# Patient Record
Sex: MALE | Race: WHITE | NOT HISPANIC OR LATINO | URBAN - METROPOLITAN AREA
[De-identification: names, ages, dates, MRNs, and addresses within clinical notes are randomized per-mention and may not be internally consistent; named-entity substitution may affect disease eponyms.]

---

## 2021-07-07 ENCOUNTER — OFFICE VISIT - RIVER FALLS (OUTPATIENT)
Dept: FAMILY MEDICINE | Facility: CLINIC | Age: 21
End: 2021-07-07

## 2021-07-07 ASSESSMENT — MIFFLIN-ST. JEOR: SCORE: 1991.92

## 2022-02-11 VITALS
HEIGHT: 70 IN | WEIGHT: 217.3 LBS | DIASTOLIC BLOOD PRESSURE: 78 MMHG | SYSTOLIC BLOOD PRESSURE: 150 MMHG | BODY MASS INDEX: 31.11 KG/M2 | HEART RATE: 75 BPM

## 2022-02-16 NOTE — NURSING NOTE
Comprehensive Intake Entered On:  7/7/2021 2:48 PM CDT    Performed On:  7/7/2021 2:44 PM CDT by Denisse Flores CMA               Summary   Chief Complaint :   Preemployment Px, backscreeen and drugscreen   Weight Measured :   217.3 lb(Converted to: 217 lb 5 oz, 98.566 kg)    Height Measured :   70 in(Converted to: 5 ft 10 in, 177.80 cm)    Body Mass Index :   31.18 kg/m2 (HI)    Body Surface Area :   2.2 m2   Systolic Blood Pressure :   150 mmHg (HI)    Diastolic Blood Pressure :   78 mmHg   Mean Arterial Pressure :   102 mmHg   Peripheral Pulse Rate :   75 bpm   BP Site :   Right arm   BP Method :   Electronic   HR Method :   Electronic   Denisse Flores CMA - 7/7/2021 2:44 PM CDT   Health Status   Allergies Verified? :   Yes   Medication History Verified? :   Yes   Tobacco Use? :   Light tobacco smoker   Denisse Flores CMA - 7/7/2021 2:44 PM CDT   Consents   Consent for Immunization Exchange :   Consent Granted   Consent for Immunizations to Providers :   Consent Granted   Denisse Flores CMA - 7/7/2021 2:44 PM CDT   Meds / Allergies   (As Of: 7/7/2021 2:48:19 PM CDT)   Allergies (Active)   No Known Medication Allergies  Estimated Onset Date:   Unspecified ; Created By:   Denisse Flores CMA; Reaction Status:   Active ; Category:   Drug ; Substance:   No Known Medication Allergies ; Type:   Allergy ; Updated By:   Denisse Flores CMA; Reviewed Date:   7/7/2021 2:47 PM CDT        Medication List   (As Of: 7/7/2021 2:48:19 PM CDT)   No Known Home Medications     Denisse Flores CMA - 7/7/2021 2:47:11 PM           Social History   Social History   (As Of: 7/7/2021 2:48:19 PM CDT)   Tobacco:        Never (less than 100 in lifetime)   (Last Updated: 7/7/2021 2:46:51 PM CDT by Denisse Flores CMA)          Electronic Cigarette/Vaping:        Electronic Cigarette Use: Use, within last 90 days.   (Last Updated: 7/7/2021 2:46:59 PM CDT by Denisse Flores CMA)

## 2022-02-16 NOTE — PROGRESS NOTES
Patient:   AMARJIT FRANCOIS            MRN: [id number]            FIN: [id number]               Age:   21 years     Sex:  Male     :  2000   Associated Diagnoses:   Pre-employment examination   Author:   Danish Montano PA-C      Report Summary   Diagnosis  Pre-employment examination ([id number] Z02.1).     Visit Information      Date of Service: 2021 02:40 pm  Performing Location: [facility]  Encounter#: [id number]      Primary Care Provider (PCP):  NONE ,       Referring Provider:  Danish Montano PA-C    NPI# [id number]      Chief Complaint   2021 2:44 PM CDT     Preemployment Px, backscreeen and drugscreen        Well Adult History   Well Adult History             The patient presents for well adult exam.  The patient's general health status is described as good.  Here for pre-placement exam.  Health history was reviewed. See form in chart..        Review of Systems   Constitutional:  Negative.    Eye:  Negative.    Ear/Nose/Mouth/Throat:  Negative.    Respiratory:  Negative.    Cardiovascular:  Negative.    Gastrointestinal:  Negative.    Genitourinary:  Negative.    Hematology/Lymphatics:  Negative.    Endocrine:  Negative.    Immunologic:  Negative.    Musculoskeletal:  Negative.    Integumentary:  Negative.    Neurologic:  Negative.    Psychiatric:  Negative.    All other systems reviewed and negative      Health Status   Allergies:    Allergic Reactions (All)  No Known Medication Allergies   Medications:  (Selected)      Problem list:    All Problems  Obesity / SNOMED CT [id number] / Probable      Histories   Past Medical History:    No active or resolved past medical history items have been selected or recorded.   Family History:    No family history items have been selected or recorded.   Procedure history:    No active procedure history items have been selected or recorded.   Social History:        Electronic Cigarette/Vaping Assessment            Electronic Cigarette Use:  Use, within last 90 days.      Tobacco Assessment            Never (less than 100 in lifetime)        Physical Examination   Vital Signs   7/7/2021 2:44 PM CDT Peripheral Pulse Rate 75 bpm    HR Method Electronic    Systolic Blood Pressure 150 mmHg  HI    Diastolic Blood Pressure 78 mmHg    Mean Arterial Pressure 102 mmHg    BP Site Right arm    BP Method Electronic      Measurements from flowsheet : Measurements   7/7/2021 2:44 PM CDT Height Measured - Standard 70 in    Weight Measured - Standard 217.3 lb    BSA 2.2 m2    Body Mass Index 31.18 kg/m2  HI      General:  Alert and oriented, No acute distress.    Eye:  Pupils are equal, round and reactive to light, Extraocular movements are intact, Normal conjunctiva.    HENT:  Normocephalic, Tympanic membranes are clear, Oral mucosa is moist, No pharyngeal erythema.    Neck:  Supple, Non-tender, No lymphadenopathy.    Respiratory:  Lungs are clear to auscultation, Respirations are non-labored, Breath sounds are equal.    Cardiovascular:  Normal rate, Regular rhythm, No murmur.    Gastrointestinal:  Soft, Non-tender, Non-distended, Normal bowel sounds, No organomegaly.    Genitourinary:  No costovertebral angle tenderness.    Musculoskeletal:  Normal range of motion, Normal strength, No swelling, No deformity, Normal gait.    Integumentary:  No rash.    Neurologic:  No focal deficits.    Psychiatric:  Cooperative, Appropriate mood & affect.       Health Maintenance      Recommendations     Pending (in the next year)        Due            Alcohol Misuse Screen due  07/07/21  and every 1  year(s)           Depression Screen due  07/07/21  and every 1  year(s)           HIV Screen (if sexually active) due  07/07/21  and every 1  year(s)           STD Counseling (if sexually active) due  07/07/21  and every 1  year(s)           Syphilis Screen (if sexually active) due  07/07/21  and every 1  year(s)           Tetanus Vaccine due  07/07/21  and every 10  year(s)        Near  Due            Influenza Vaccine near due  09/01/21  and every 1  year(s)     Satisfied (in the past 1 year)        Satisfied            Body Mass Index Check on  07/07/21.           High Blood Pressure Screen on  07/07/21.           Obesity Screen and Counseling on  07/07/21.           Tobacco Use Screen on  07/07/21.          Impression and Plan   Diagnosis     Pre-employment examination ([id number] Z02.1).     See back screen report. Cleared without restriction.